# Patient Record
Sex: FEMALE | Race: WHITE
[De-identification: names, ages, dates, MRNs, and addresses within clinical notes are randomized per-mention and may not be internally consistent; named-entity substitution may affect disease eponyms.]

---

## 2019-03-10 ENCOUNTER — HOSPITAL ENCOUNTER (OUTPATIENT)
Dept: HOSPITAL 92 - ERS | Age: 54
Setting detail: OBSERVATION
LOS: 2 days | Discharge: HOME | End: 2019-03-12
Attending: INTERNAL MEDICINE | Admitting: INTERNAL MEDICINE
Payer: COMMERCIAL

## 2019-03-10 VITALS — BODY MASS INDEX: 37.5 KG/M2

## 2019-03-10 DIAGNOSIS — G43.909: ICD-10-CM

## 2019-03-10 DIAGNOSIS — I10: ICD-10-CM

## 2019-03-10 DIAGNOSIS — Z79.899: ICD-10-CM

## 2019-03-10 DIAGNOSIS — Z88.0: ICD-10-CM

## 2019-03-10 DIAGNOSIS — E03.9: ICD-10-CM

## 2019-03-10 DIAGNOSIS — K21.9: ICD-10-CM

## 2019-03-10 DIAGNOSIS — Z88.5: ICD-10-CM

## 2019-03-10 DIAGNOSIS — R55: Primary | ICD-10-CM

## 2019-03-10 LAB
ALBUMIN SERPL BCG-MCNC: 4.2 G/DL (ref 3.5–5)
ALP SERPL-CCNC: 97 U/L (ref 40–150)
ALT SERPL W P-5'-P-CCNC: 18 U/L (ref 8–55)
ANION GAP SERPL CALC-SCNC: 13 MMOL/L (ref 10–20)
APAP SERPL-MCNC: (no result) MCG/ML (ref 10–30)
AST SERPL-CCNC: 19 U/L (ref 5–34)
BASOPHILS # BLD AUTO: 0.1 THOU/UL (ref 0–0.2)
BASOPHILS NFR BLD AUTO: 1.6 % (ref 0–1)
BILIRUB SERPL-MCNC: 0.3 MG/DL (ref 0.2–1.2)
BUN SERPL-MCNC: 18 MG/DL (ref 9.8–20.1)
CALCIUM SERPL-MCNC: 10.3 MG/DL (ref 7.8–10.44)
CHLORIDE SERPL-SCNC: 110 MMOL/L (ref 98–107)
CK SERPL-CCNC: 80 U/L (ref 29–168)
CO2 SERPL-SCNC: 25 MMOL/L (ref 22–29)
CREAT CL PREDICTED SERPL C-G-VRATE: 0 ML/MIN (ref 70–130)
DRUG SCREEN CUTOFF: (no result)
EOSINOPHIL # BLD AUTO: 0.3 THOU/UL (ref 0–0.7)
EOSINOPHIL NFR BLD AUTO: 4.5 % (ref 0–10)
GLOBULIN SER CALC-MCNC: 2.8 G/DL (ref 2.4–3.5)
GLUCOSE SERPL-MCNC: 90 MG/DL (ref 70–105)
HGB BLD-MCNC: 12.6 G/DL (ref 12–16)
LIPASE SERPL-CCNC: 31 U/L (ref 8–78)
LYMPHOCYTES # BLD: 1.5 THOU/UL (ref 1.2–3.4)
LYMPHOCYTES NFR BLD AUTO: 26.2 % (ref 21–51)
MCH RBC QN AUTO: 27.6 PG (ref 27–31)
MCV RBC AUTO: 89.4 FL (ref 78–98)
MEDTOX CONTROL LINE VALID?: (no result)
MEDTOX READER #: (no result)
MONOCYTES # BLD AUTO: 0.8 THOU/UL (ref 0.11–0.59)
MONOCYTES NFR BLD AUTO: 13.1 % (ref 0–10)
NEUTROPHILS # BLD AUTO: 3.2 THOU/UL (ref 1.4–6.5)
NEUTROPHILS NFR BLD AUTO: 54.5 % (ref 42–75)
PLATELET # BLD AUTO: 266 THOU/UL (ref 130–400)
POTASSIUM SERPL-SCNC: 4.5 MMOL/L (ref 3.5–5.1)
RBC # BLD AUTO: 4.56 MILL/UL (ref 4.2–5.4)
SALICYLATES SERPL-MCNC: (no result) MG/DL (ref 15–30)
SODIUM SERPL-SCNC: 143 MMOL/L (ref 136–145)
SP GR UR STRIP: 1.01 (ref 1–1.04)
TROPONIN I SERPL DL<=0.01 NG/ML-MCNC: (no result) NG/ML (ref ?–0.03)
TROPONIN I SERPL DL<=0.01 NG/ML-MCNC: (no result) NG/ML (ref ?–0.03)
WBC # BLD AUTO: 5.9 THOU/UL (ref 4.8–10.8)

## 2019-03-10 PROCEDURE — 80053 COMPREHEN METABOLIC PANEL: CPT

## 2019-03-10 PROCEDURE — 96375 TX/PRO/DX INJ NEW DRUG ADDON: CPT

## 2019-03-10 PROCEDURE — 83880 ASSAY OF NATRIURETIC PEPTIDE: CPT

## 2019-03-10 PROCEDURE — 84146 ASSAY OF PROLACTIN: CPT

## 2019-03-10 PROCEDURE — 71045 X-RAY EXAM CHEST 1 VIEW: CPT

## 2019-03-10 PROCEDURE — 80307 DRUG TEST PRSMV CHEM ANLYZR: CPT

## 2019-03-10 PROCEDURE — 70551 MRI BRAIN STEM W/O DYE: CPT

## 2019-03-10 PROCEDURE — G0378 HOSPITAL OBSERVATION PER HR: HCPCS

## 2019-03-10 PROCEDURE — 82140 ASSAY OF AMMONIA: CPT

## 2019-03-10 PROCEDURE — 87086 URINE CULTURE/COLONY COUNT: CPT

## 2019-03-10 PROCEDURE — 36415 COLL VENOUS BLD VENIPUNCTURE: CPT

## 2019-03-10 PROCEDURE — 80048 BASIC METABOLIC PNL TOTAL CA: CPT

## 2019-03-10 PROCEDURE — 70496 CT ANGIOGRAPHY HEAD: CPT

## 2019-03-10 PROCEDURE — 87040 BLOOD CULTURE FOR BACTERIA: CPT

## 2019-03-10 PROCEDURE — 71275 CT ANGIOGRAPHY CHEST: CPT

## 2019-03-10 PROCEDURE — 96361 HYDRATE IV INFUSION ADD-ON: CPT

## 2019-03-10 PROCEDURE — 85025 COMPLETE CBC W/AUTO DIFF WBC: CPT

## 2019-03-10 PROCEDURE — 70498 CT ANGIOGRAPHY NECK: CPT

## 2019-03-10 PROCEDURE — 85379 FIBRIN DEGRADATION QUANT: CPT

## 2019-03-10 PROCEDURE — 70450 CT HEAD/BRAIN W/O DYE: CPT

## 2019-03-10 PROCEDURE — 96365 THER/PROPH/DIAG IV INF INIT: CPT

## 2019-03-10 PROCEDURE — 84443 ASSAY THYROID STIM HORMONE: CPT

## 2019-03-10 PROCEDURE — 33285 INSJ SUBQ CAR RHYTHM MNTR: CPT

## 2019-03-10 PROCEDURE — 80306 DRUG TEST PRSMV INSTRMNT: CPT

## 2019-03-10 PROCEDURE — 87077 CULTURE AEROBIC IDENTIFY: CPT

## 2019-03-10 PROCEDURE — 93005 ELECTROCARDIOGRAM TRACING: CPT

## 2019-03-10 PROCEDURE — 93306 TTE W/DOPPLER COMPLETE: CPT

## 2019-03-10 PROCEDURE — 84484 ASSAY OF TROPONIN QUANT: CPT

## 2019-03-10 PROCEDURE — C1764 EVENT RECORDER, CARDIAC: HCPCS

## 2019-03-10 PROCEDURE — 83690 ASSAY OF LIPASE: CPT

## 2019-03-10 PROCEDURE — 81003 URINALYSIS AUTO W/O SCOPE: CPT

## 2019-03-10 PROCEDURE — 82550 ASSAY OF CK (CPK): CPT
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
## 2019-03-10 NOTE — CT
CTA THORAX WITH CONTRAST: 

 

(Computed Tomographic Angiography, chest(noncoronary) with contrast material, and image postprocessin
g)

(PE protocol)

 

DATE: 3/10/2019.

 

HISTORY: 

A 54-year-old female with elevated D-dimer, status post syncope.

 

TECHNIQUE: 

IV injection of iodinated contrast: 100 mL of Isovue 370.

Scan acquisition timing attempted to coincide with iodinated contrast bolus reaching maximal density 
in pulmonary arteries.

3D MIP reconstructions.

 

FINDINGS:

A small, apparently paraesophageal, hiatal hernia.  No thoracic aortic aneurysm or dissection.  No pu
lmonary thromboembolism.  No pleural effusion, pneumothorax, consolidation, or pulmonary edema.

 

IMPRESSION:

1.  No pulmonary thromboembolism.

 

2.  No acute intrathoracic findings.

 

3.  Small paraesophageal hiatal hernia.

 

 

alejandra[]

 

POS: [facility]
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
## 2019-03-11 LAB
ANION GAP SERPL CALC-SCNC: 11 MMOL/L (ref 10–20)
BASOPHILS # BLD AUTO: 0.1 THOU/UL (ref 0–0.2)
BASOPHILS NFR BLD AUTO: 1.4 % (ref 0–1)
BUN SERPL-MCNC: 16 MG/DL (ref 9.8–20.1)
CALCIUM SERPL-MCNC: 9.3 MG/DL (ref 7.8–10.44)
CHLORIDE SERPL-SCNC: 109 MMOL/L (ref 98–107)
CO2 SERPL-SCNC: 23 MMOL/L (ref 22–29)
CREAT CL PREDICTED SERPL C-G-VRATE: 144 ML/MIN (ref 70–130)
EOSINOPHIL # BLD AUTO: 0.4 THOU/UL (ref 0–0.7)
EOSINOPHIL NFR BLD AUTO: 7.3 % (ref 0–10)
GLUCOSE SERPL-MCNC: 87 MG/DL (ref 70–105)
HGB BLD-MCNC: 11.7 G/DL (ref 12–16)
LYMPHOCYTES # BLD: 2.4 THOU/UL (ref 1.2–3.4)
LYMPHOCYTES NFR BLD AUTO: 40 % (ref 21–51)
MCH RBC QN AUTO: 28.2 PG (ref 27–31)
MCV RBC AUTO: 90.4 FL (ref 78–98)
MONOCYTES # BLD AUTO: 0.7 THOU/UL (ref 0.11–0.59)
MONOCYTES NFR BLD AUTO: 11.1 % (ref 0–10)
NEUTROPHILS # BLD AUTO: 2.4 THOU/UL (ref 1.4–6.5)
NEUTROPHILS NFR BLD AUTO: 40.2 % (ref 42–75)
PLATELET # BLD AUTO: 226 THOU/UL (ref 130–400)
POTASSIUM SERPL-SCNC: 4 MMOL/L (ref 3.5–5.1)
RBC # BLD AUTO: 4.14 MILL/UL (ref 4.2–5.4)
SODIUM SERPL-SCNC: 139 MMOL/L (ref 136–145)
WBC # BLD AUTO: 5.9 THOU/UL (ref 4.8–10.8)

## 2019-03-11 RX ADMIN — BUTALBITAL, ASPIRIN, AND CAFFEINE PRN TAB: 50; 325; 40 CAPSULE ORAL at 15:56
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
## 2019-03-11 NOTE — MRI
MRI BRAIN WITHOUT CONTRAST:

 

INDICATIONS:

Syncope.

 

FINDINGS:

There is no ventriculomegaly, mass effect, midline shift, or acute territorial infarction.  No hemorr
hagic susceptibility.  There is mild chronic ischemic disease.  There is a partially empty sella.

 

IMPRESSION:

No acute territorial infarction or intracranial mass effect.

 

POS: [facility]
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
## 2019-03-11 NOTE — PDOC.PN
- Subjective


Encounter Start Date: 03/11/19


Encounter Start Time: 10:10


Subjective: Patient with migraine HA all morning. Usually takes Fiorocet at 


-: home for this. No dizziness. No presyncopal symptoms.





- Objective


MAR Reviewed: Yes


Vital Signs & Weight: 


 Vital Signs (12 hours)











  Temp Pulse Resp BP BP BP BP


 


 03/11/19 07:25  98.4 F  61  18  145/69 H   


 


 03/11/19 03:07  97.8 F  62  20     132/72


 


 03/10/19 23:46  97.6 F  59 L  20  161/79 H   


 


 03/10/19 23:19   60    149/74 H  148/74 H  152/73 H


 


 03/10/19 22:31  97.9 F  63  20     140/72


 


 03/10/19 21:53       














  Pulse Ox


 


 03/11/19 07:25  97


 


 03/11/19 03:07  92 L


 


 03/10/19 23:46  99


 


 03/10/19 23:19 


 


 03/10/19 22:31  98


 


 03/10/19 21:53  97








 Weight











Weight                         225 lb 11.2 oz














I&O: 


 











 03/10/19 03/11/19 03/12/19





 06:59 06:59 06:59


 


Intake Total  720 


 


Output Total  600 


 


Balance  120 











Result Diagrams: 


 03/11/19 05:06





 03/11/19 05:06





Phys Exam





- Physical Examination


Constitutional: NAD


HEENT: moist MMs


Respiratory: no wheezing, no rales, no rhonchi, clear to auscultation bilateral


Cardiovascular: RRR, no significant murmur


Gastrointestinal: soft, positive bowel sounds


Neurological: non-focal, moves all 4 limbs


Psychiatric: normal affect, A&O x 3





Dx/Plan


(1) Syncope


Code(s): R55 - SYNCOPE AND COLLAPSE   Status: Acute   Comment: no orthostatic 

BPs, ECHO and MRI pending   





(2) Hypertension


Code(s): I10 - ESSENTIAL (PRIMARY) HYPERTENSION   Status: Chronic   





(3) GERD (gastroesophageal reflux disease)


Code(s): K21.9 - GASTRO-ESOPHAGEAL REFLUX DISEASE WITHOUT ESOPHAGITIS   Status: 

Chronic   





(4) Hypothyroidism


Code(s): E03.9 - HYPOTHYROIDISM, UNSPECIFIED   Status: Chronic   





(5) Migraine


Code(s): G43.909 - MIGRAINE, UNSP, NOT INTRACTABLE, WITHOUT STATUS MIGRAINOSUS 

  Status: Acute   Comment: restart home meds, if not effective can try reglan 

and benadryl combination   





- Plan


cont current plan of care, out of bed/ambulate


awaiting cardiology and neurology consults





* .








- Discharge Day


Encounter end time: 10:20

## 2019-03-12 VITALS — SYSTOLIC BLOOD PRESSURE: 130 MMHG | TEMPERATURE: 98.7 F | DIASTOLIC BLOOD PRESSURE: 74 MMHG

## 2019-03-12 PROCEDURE — 0JH632Z INSERTION OF MONITORING DEVICE INTO CHEST SUBCUTANEOUS TISSUE AND FASCIA, PERCUTANEOUS APPROACH: ICD-10-PCS | Performed by: INTERNAL MEDICINE

## 2019-03-12 RX ADMIN — BUTALBITAL, ASPIRIN, AND CAFFEINE PRN TAB: 50; 325; 40 CAPSULE ORAL at 11:49

## 2019-03-12 NOTE — PRG
DATE OF SERVICE:  03/12/2019



CHIEF COMPLAINT:  Dizziness.



INTERVAL HISTORY:  The patient reports she was down to get a loop recorder today and

during the placement of the loop recorder, she had a hypotensive episode with

dizziness, and hopefully that was recorded at that time.  No further events were

noted and her blood pressure did go down further, and as her current report, CT

angiogram of the head and neck area showed no abnormalities.  Her MRI of the brain

was normal. 



LABORATORY WORKUP:  White count 5.9, hemoglobin 11.7, hematocrit 37.4, platelets

226.  Chemistry; sodium 139, potassium 4, chloride 109, BUN 16, creatinine 0.73. 



PHYSICAL EXAMINATION:

VITAL SIGNS:  Temperature 98.5, blood pressure 139/66, pulse 64. 

GENERAL APPEARANCE:  Well-built, well-nourished lady, who is comfortable in bed.

Higher intellectual functions normal.  Cranial nerves, normal extraocular movements.

 Tongue midline.  Motor; bulk normal, tone normal, strength 5/5 in upper and lower

extremities. 



IMPRESSION:  The patient with dizziness, described to me as spinning sensation.  She

had prior ENT workup, which was negative.  She had two episodes of loss of

consciousness so far, including this event that brought her to the hospital.  She

had a prior event in December, which was attributed to dehydration.  Her episode

does not sound like seizures, and this is more likely ENT cause or hypotension or

cardiac in nature.  Her CT angiogram is also negative for any vascular occlusion.

At this time, we do not have a clear answer for her dizziness. 



RECOMMENDATIONS:  

1. She will need re-evaluation by ENT, particularly testing for vertigo and inner

ear issues. 

2. She can follow up with Dr. Loja as outpatient.  We will check on her again if

she is still inpatient. 





Job ID:  [id number]

## 2019-03-12 NOTE — PDOC.PN
- Subjective


Encounter Start Date: 03/12/19


Encounter Start Time: 11:20


Subjective: Patient without further HA after fiorocet. Had some hypotension 

without


-: bradycardia during loop recorder implantation. Fine since.





- Objective


MAR Reviewed: Yes


Vital Signs & Weight: 


 Vital Signs (12 hours)











  Temp Pulse Resp BP BP Pulse Ox


 


 03/12/19 07:13  98.7 F  62  16   136/71  99


 


 03/12/19 02:30  98 F  54 L  16  115/58 L   95








 Weight











Weight                         227 lb 8 oz














I&O: 


 











 03/11/19 03/12/19 03/13/19





 06:59 06:59 06:59


 


Intake Total 720 2520 


 


Output Total 600 3200 


 


Balance 120 -680 











Result Diagrams: 


 03/11/19 05:06





 03/11/19 05:06





Phys Exam





- Physical Examination


Constitutional: NAD


HEENT: moist MMs


Respiratory: no wheezing, no rales, no rhonchi


incision from loop recorder well closed, no bleeding, no edema, no hematoma


Cardiovascular: RRR, no significant murmur


Musculoskeletal: no edema


Neurological: non-focal, moves all 4 limbs


Psychiatric: normal affect, A&O x 3





Dx/Plan


(1) Syncope


Code(s): R55 - SYNCOPE AND COLLAPSE   Status: Acute   Comment: no orthostatic 

BPs, ECHO and MRI normal, possible implantable loop recorder   





(2) Hypertension


Code(s): I10 - ESSENTIAL (PRIMARY) HYPERTENSION   Status: Chronic   





(3) GERD (gastroesophageal reflux disease)


Code(s): K21.9 - GASTRO-ESOPHAGEAL REFLUX DISEASE WITHOUT ESOPHAGITIS   Status: 

Chronic   





(4) Hypothyroidism


Code(s): E03.9 - HYPOTHYROIDISM, UNSPECIFIED   Status: Chronic   





(5) Migraine


Code(s): G43.909 - MIGRAINE, UNSP, NOT INTRACTABLE, WITHOUT STATUS MIGRAINOSUS 

  Status: Acute   Comment: restart home meds, if not effective can try reglan 

and benadryl combination   





- Plan


cont current plan of care, out of bed/ambulate, DVT proph w/lovenox


d/c if CT angio head and neck negative and f/u with cardiology in 7-10 days


-: no driving until cleared by Dr. Stauffer. Patient and  expressed 


-: understanding and agreement.





* .








- Discharge Day


Encounter end time: 11:40

## 2019-03-12 NOTE — CT
CT BRAIN WITHOUT CONTRAST 

CT ANGIOGRAM HEAD WITH CONTRAST

CT ANGIOGRAM NECK WITH CONTRAST:

 

Date:  03/12/19 

 

HISTORY:  

Dizziness. 

 

COMPARISON:  

MRI brain yesterday. CT brain 2 days ago. 

 

TECHNIQUE:  

CT of the brain performed without intravenous contrast. Subsequently, a CT angiogram of the head and 
neck was performed after the intravenous administration of contrast. 3D rendering provided. 

 

FINDINGS:

No acute hemorrhage or infarct. No midline shift or mass effect. Ventricular size and extra-axial CSF
 spaces are normal. Calvarium is intact. Paranasal sinuses and mastoids are clear. Globes are normal.
 

 

IMPRESSION: 

No acute intracranial hemorrhage or infarct. 

 

CT ANGIOGRAM HEAD/NECK:

The lung apices are clear. Mild degenerative disc space disease at C5-6. 

 

The vertebral arteries are codominant. Origins of both vertebral arteries are patent. No luminal irre
gularity. 

 

The origins of both common carotid arteries are patent. Per NASCET criteria, there is no hemodynamica
lly significant stenosis of the internal carotid arteries. 

 

[place] is patent without stenosis, thrombosis, nor aneurysm formation. Globes are normal. 

 

IMPRESSION: 

Normal CT angiogram of the head and neck. 

 

 

 

POS: [facility]

## 2019-03-12 NOTE — PDOC.CTH
Cardiology Progress Note





- Subjective





No new c/o's this AM. She did have some mild lightheadedness earlier but no 

changes on the monitor. I rediscussed the Linq loop recorded and she wishes to 

proceed.





- Objective


 Vital Signs











  Temp Pulse Resp BP BP Pulse Ox


 


 03/12/19 12:04  98.9 F  66  16   130/69  98


 


 03/12/19 10:12  98.5 F  64  20   139/66  99


 


 03/12/19 07:13  98.7 F  62  16   136/71  99


 


 03/12/19 02:30  98 F  54 L  16  115/58 L   95








 











Weight                         227 lb 8 oz














 











 03/11/19 03/12/19 03/13/19





 06:59 06:59 06:59


 


Intake Total 720 2520 


 


Output Total 600 3200 700


 


Balance 120 -680 -700














- Physical Examination


General/Neuro: alert & oriented x3


Neck: carotid US brisk


Lungs: CTA, unlabored respirations


Heart: PMI normal, RRR


Abdomen: NT/ND, soft





- Labs


Result Diagrams: 


 03/11/19 05:06





 03/11/19 05:06


 Troponin/CKMB











Troponin I  Less than  0.010 ng/mL (< 0.028)   03/10/19  18:47    














- Assessment/Plan





1. Syncope of undetermined etiology. Plan for implantable loop recoreder 

implant today. Pt. can be d/c'd after the Linq is placed. Follow up with Xiomy 

in 7-10 days in the office.

## 2019-03-13 NOTE — DIS
DATE OF ADMISSION:  03/10/2019



DATE OF DISCHARGE:  03/12/2019



PRIMARY CARE PHYSICIAN:  Ivy Jones.



REASON FOR ADMISSION:  Syncope.



DISCHARGE DIAGNOSES:  

1. Syncope, possibly cardiogenic in origin.

2. Hypertension.

3. Gastroesophageal reflux disease.

4. Hypothyroidism.

5. Migraine.



PROCEDURES:  

1. CT of the chest with contrast showing no pulmonary embolism and no acute

intrathoracic findings.  There is a small hiatal hernia. 

2. MRI of the brain showing no acute infarction or mass.

3. Echocardiogram showing ejection fraction of 55% to 60%.  No other severe

abnormalities. 

4. CT angiography of the head and neck showing no abnormalities.



CONSULTATIONS:  

1. Cardiology, Dr. Stauffer.

2. Neurology, Dr. Colon.



SUMMARY OF HOSPITAL COURSE:  This is a 54-year-old white female, who presented to

the hospital after a syncopal episode while she was driving, she started feeling

lightheaded.  While she was driving to [facility], her vision became cloudy.  She pulled

off into a parking lot, put on the parking bay and then passed out for an unknown

period of time.  She was trembling when she woke up and had a headache, similar to

her normal migraines.  She was seen in the hospital.  Her CT brain and CT of the

chest were all normal.  Her EKG was normal sinus rhythm.  She was put in observation

in the hospital.  Neurology and Cardiology were consulted.  On the heart monitor,

the patient had intermittent mild bradycardia that was asymptomatic, but no

discernible cause for her syncopal episode.  An MRI of the brain and CT angiography

of the brain, head, and neck were done by Neurology, which were all normal and she

was determined not to have a primarily neurologic cause of her syncope.  Dr. Stauffer

evaluated the patient and eventually decided to put in an implantable loop recorder.

 The patient actually did have a hypotensive spell at the end of this procedure.

There was no arrhythmia or bradycardia at that time.  This resolved quickly, thought

to possibly be due to the injection of lidocaine for the numbing of the chest wall.

Otherwise, she did well during hospitalization.  She did have a migraine headache

and was treated with one of her typical Fioricet with resolution.  She had no more

syncopal episodes.  She is doing well the day of discharge and is being discharged

home. 



DISCHARGE MANAGEMENT:  Discharged home.  Follow up with Dr. Stauffer in 7 to 10 days and

with Dr. Loja in a couple of weeks. 



ACTIVITY:  As tolerated, but patient is not to do driving until cleared by Dr. Stauffer.



DIET:  Healthy heart, low-sodium diet.



MEDICATIONS:  The patient is to resume all of her home medications.

1. Migraine release caplets two tablets every 6 hours as needed.

2. Fioricet 1 cap every 4 hours as needed for migraine headaches.

3. Vitamin D3 6000 units daily.

4. Lisinopril 10 mg daily.

5. Loratadine 10 mg daily.

6. Naproxen 220 mg twice a day as needed for pain.

7. Protonix 40 mg twice a day.

8. Sertraline 50 mg at night.

9. [place] Thyroid 30 mg every day and another 15 mg on Monday, Wednesday, and Friday.







Job ID:  [id number]

## 2019-03-13 NOTE — CCL
DATE OF PROCEDURE:

3/12/19

 

INDICATION:

54-year-old female with unexplained syncope. She was advised to undergo an implantable loop recorder.
 This is the second time this patient has had an episode of syncope. 

 

She was taken to the recovery area where she underwent sterile prep and then had local anesthesia luisa
lied with 1% lidocaine. Then using the sterile technique, the implantable loop device was easily impl
anted without difficulty or complication. At the end of the procedure, the patient did have hypotensi
on, a vasovagal episode but did not have any pinky syncope. The blood pressure did decrease to 60/30 
and then she was given normal saline and in a few minutes the patient recovered from the event. 

 

IMPRESSION:  

54-year-old female with unexplained syncope who underwent an implantable loop recorder today, MAGALY jacob
 i[employer] without any significant difficulties or complications except for the vasovagal episode a
nd hypotension.

## 2019-03-16 NOTE — EKG
Test Reason : NEAR SYNCOPE

Blood Pressure : ***/*** mmHG

Vent. Rate : 077 BPM     Atrial Rate : 077 BPM

   P-R Int : 150 ms          QRS Dur : 080 ms

    QT Int : 380 ms       P-R-T Axes : 031 -06 016 degrees

   QTc Int : 430 ms

 

*** Poor data quality, interpretation may be adversely affected

Normal sinus rhythm

Cannot rule out Anterior infarct , age undetermined

Abnormal ECG

 

Confirmed by MALENA CEVALLOS, DAYAN ([id number]),  PIERRE WORLEY ([id number]) on 3/16/2019 1:44:06 PM

 

Referred By:  MALENA           Confirmed By:DAYAN GUY MD

## 2019-06-07 ENCOUNTER — HOSPITAL ENCOUNTER (OUTPATIENT)
Dept: HOSPITAL 92 - BICMRI | Age: 54
Discharge: HOME | End: 2019-06-07
Attending: ORTHOPAEDIC SURGERY
Payer: COMMERCIAL

## 2019-06-07 DIAGNOSIS — S46.012A: Primary | ICD-10-CM

## 2019-06-07 DIAGNOSIS — S46.812A: ICD-10-CM

## 2019-06-07 NOTE — MRI
LEFT SHOULDER MRI WITHOUT IV CONTRAST:

 

HISTORY: 

Strain of left rotator cuff capsule, left shoulder pain, and limited range of motion for several year
s without trauma.

 

FINDINGS: 

Multiplanar, multisequence MRI examination of the left shoulder is performed.  AC joint arthrosis blanco
nges are noted with some subchondral cystic changes.  Prominent downsloping of the lateral acromion w
ith some minimal fluid and fat stranding in the subacromial bursa.  Irregular small full-thickness te
ar of the anterior supraspinatus tendon at the insertion with some associated tendinopathy.  Undersur
face and minimally delaminating tear of the subscapularis tendon superiorly.  The infraspinatus tendo
n appears intact.  Rotator cuff [id number] are within normal limits of signal and volume.  

 

IMPRESSION: 

1.  Small full-thickness nonretracted tear of the anterior supraspinatus tendon with some associated 
tendinopathy.

 

2.  Downsloping of the lateral acromion with fluid and fat stranding in the subacromial bursa.

 

3.  Small undersurface and delaminating tear of the upper subscapularis tendon.

 

POS: C

## 2019-06-19 ENCOUNTER — HOSPITAL ENCOUNTER (EMERGENCY)
Dept: HOSPITAL 92 - ERS | Age: 54
Discharge: HOME | End: 2019-06-19
Payer: COMMERCIAL

## 2019-06-19 DIAGNOSIS — I10: ICD-10-CM

## 2019-06-19 DIAGNOSIS — F41.9: ICD-10-CM

## 2019-06-19 DIAGNOSIS — K52.9: Primary | ICD-10-CM

## 2019-06-19 DIAGNOSIS — Z79.899: ICD-10-CM

## 2019-06-19 LAB
ALBUMIN SERPL BCG-MCNC: 4.1 G/DL (ref 3.5–5)
ALP SERPL-CCNC: 103 U/L (ref 40–150)
ALT SERPL W P-5'-P-CCNC: 17 U/L (ref 8–55)
ANION GAP SERPL CALC-SCNC: 15 MMOL/L (ref 10–20)
AST SERPL-CCNC: 13 U/L (ref 5–34)
BASOPHILS # BLD AUTO: 0 THOU/UL (ref 0–0.2)
BASOPHILS NFR BLD AUTO: 0.2 % (ref 0–1)
BILIRUB SERPL-MCNC: 0.5 MG/DL (ref 0.2–1.2)
BUN SERPL-MCNC: 25 MG/DL (ref 9.8–20.1)
CALCIUM SERPL-MCNC: 9.6 MG/DL (ref 7.8–10.44)
CHLORIDE SERPL-SCNC: 107 MMOL/L (ref 98–107)
CO2 SERPL-SCNC: 20 MMOL/L (ref 22–29)
CREAT CL PREDICTED SERPL C-G-VRATE: 0 ML/MIN (ref 70–130)
EOSINOPHIL # BLD AUTO: 0 THOU/UL (ref 0–0.7)
EOSINOPHIL NFR BLD AUTO: 0.3 % (ref 0–10)
GLOBULIN SER CALC-MCNC: 3 G/DL (ref 2.4–3.5)
GLUCOSE SERPL-MCNC: 113 MG/DL (ref 70–105)
HGB BLD-MCNC: 12.5 G/DL (ref 12–16)
LIPASE SERPL-CCNC: 15 U/L (ref 8–78)
LYMPHOCYTES # BLD: 1 THOU/UL (ref 1.2–3.4)
LYMPHOCYTES NFR BLD AUTO: 7.2 % (ref 21–51)
MCH RBC QN AUTO: 28.5 PG (ref 27–31)
MCV RBC AUTO: 86 FL (ref 78–98)
MONOCYTES # BLD AUTO: 1.4 THOU/UL (ref 0.11–0.59)
MONOCYTES NFR BLD AUTO: 9.5 % (ref 0–10)
NEUTROPHILS # BLD AUTO: 11.9 THOU/UL (ref 1.4–6.5)
NEUTROPHILS NFR BLD AUTO: 82.8 % (ref 42–75)
PLATELET # BLD AUTO: 203 THOU/UL (ref 130–400)
POTASSIUM SERPL-SCNC: 3.8 MMOL/L (ref 3.5–5.1)
RBC # BLD AUTO: 4.4 MILL/UL (ref 4.2–5.4)
SODIUM SERPL-SCNC: 138 MMOL/L (ref 136–145)
SP GR UR STRIP: (no result) (ref 1–1.04)
WBC # BLD AUTO: 14.4 THOU/UL (ref 4.8–10.8)

## 2019-06-19 PROCEDURE — 85025 COMPLETE CBC W/AUTO DIFF WBC: CPT

## 2019-06-19 PROCEDURE — 86901 BLOOD TYPING SEROLOGIC RH(D): CPT

## 2019-06-19 PROCEDURE — 74177 CT ABD & PELVIS W/CONTRAST: CPT

## 2019-06-19 PROCEDURE — 83605 ASSAY OF LACTIC ACID: CPT

## 2019-06-19 PROCEDURE — 81003 URINALYSIS AUTO W/O SCOPE: CPT

## 2019-06-19 PROCEDURE — 85379 FIBRIN DEGRADATION QUANT: CPT

## 2019-06-19 PROCEDURE — 80053 COMPREHEN METABOLIC PANEL: CPT

## 2019-06-19 PROCEDURE — 83690 ASSAY OF LIPASE: CPT

## 2019-06-19 PROCEDURE — 82274 ASSAY TEST FOR BLOOD FECAL: CPT

## 2019-06-19 PROCEDURE — 86850 RBC ANTIBODY SCREEN: CPT

## 2019-06-19 PROCEDURE — 93005 ELECTROCARDIOGRAM TRACING: CPT

## 2019-06-19 PROCEDURE — 96361 HYDRATE IV INFUSION ADD-ON: CPT

## 2019-06-19 PROCEDURE — 84484 ASSAY OF TROPONIN QUANT: CPT

## 2019-06-19 PROCEDURE — 86900 BLOOD TYPING SEROLOGIC ABO: CPT

## 2019-06-19 PROCEDURE — 96360 HYDRATION IV INFUSION INIT: CPT

## 2019-06-19 PROCEDURE — 36415 COLL VENOUS BLD VENIPUNCTURE: CPT

## 2019-06-19 NOTE — CT
CT ABDOMEN PELVIS WITH IV CONTRAST



HISTORY: blood in stool



COMPARISON: None



FINDINGS: The lung bases are clear. A small pericardial effusion is present. The liver, spleen, pancr
eas, adrenal glands and kidneys are normal. No calcified gallstones are seen.



A small hiatal hernia is noted. No free air, free fluid or lymphadenopathy seen in the abdomen or pel
vis. The patient is post hysterectomy. This minimal pericolonic inflammatory change in the left mid

abdomen. There are vascular calcifications without evidence of aneurysmal dilatation of the abdominal
 aorta. There are degenerative changes in the spine.



IMPRESSION:

1. Findings suggestive of left colitis. Colonoscopy is recommended.

2. Small hiatal hernia.

3. Small pericardial effusion.



Reported By: John Paul Jacome 

Electronically Signed:  6/19/2019 1:07 PM

## 2019-06-22 NOTE — EKG
Test Reason : 

Blood Pressure : ***/*** mmHG

Vent. Rate : 075 BPM     Atrial Rate : 075 BPM

   P-R Int : 164 ms          QRS Dur : 082 ms

    QT Int : 394 ms       P-R-T Axes : 030 005 032 degrees

   QTc Int : 439 ms

 

Normal sinus rhythm

Normal ECG

 

Confirmed by MICHELINE SRINIVASAN ([id number]),  PIERRE WORLEY ([id number]) on 6/22/2019 9:40:14 AM

 

Referred By:             Confirmed By:MICHELINE SRINIVASAN

## 2019-07-18 ENCOUNTER — HOSPITAL ENCOUNTER (OUTPATIENT)
Dept: HOSPITAL 92 - SDC | Age: 54
End: 2019-07-18
Attending: INTERNAL MEDICINE
Payer: COMMERCIAL

## 2019-07-18 DIAGNOSIS — Z79.899: ICD-10-CM

## 2019-07-18 DIAGNOSIS — Z88.0: ICD-10-CM

## 2019-07-18 DIAGNOSIS — Z88.5: ICD-10-CM

## 2019-07-18 DIAGNOSIS — K52.9: Primary | ICD-10-CM

## 2019-07-18 PROCEDURE — 0DJD8ZZ INSPECTION OF LOWER INTESTINAL TRACT, VIA NATURAL OR ARTIFICIAL OPENING ENDOSCOPIC: ICD-10-PCS | Performed by: INTERNAL MEDICINE

## 2019-07-18 NOTE — OP
DATE OF PROCEDURE:  07/18/2019



PROCEDURE PERFORMED:  Colonoscopy.



PREOPERATIVE DIAGNOSES:  Abnormal CT scan showing inflammatory changes of the left

colon.  She had ischemic colitis most likely based on her clinical history and

episode of hypotension preceding the episode of pain and bloody diarrhea and

findings on the CT of left-sided colitis. 



DESCRIPTION OF PROCEDURE:  Informed consent was obtained from the patient.  She was

sedated with total intravenous anesthesia.  The rectal exam was performed and was

normal.  The colonoscope was advanced to the cecum, where the ileocecal valve and

appendiceal orifice were clearly identified.  The colonic mucosa was normal

throughout.  The preparation quality was excellent.  Retroflexed views in the rectum

were normal. 



IMPRESSION:  

1. Normal colonoscopy.

2. She most likely had an acute episode of ischemic colitis that has now resolved.



RECOMMENDATIONS:  

1. Repeat colonoscopy in 10 years for screening.

2. She is encouraged to maintain adequate hydration and monitoring of her blood

pressure, especially with the use of antihypertensive medications to try to avoid

hypotensive episodes.  She is undergoing a cardiac evaluation with Dr. Looney now

regarding that as well.  Other consideration is any p.r.n. medication such as

triptans for migraine headaches, can potentially precipitate ischemic colitis;

however, she states she did not take any of these medications around the time of

this episode. 

3. Follow up in GI clinic as needed.







Job ID:  [id number]

## 2023-02-18 ENCOUNTER — HOSPITAL ENCOUNTER (EMERGENCY)
Dept: HOSPITAL 57 - BURERS | Age: 58
Discharge: ANOTHER HOSPITAL | End: 2023-02-18
Payer: COMMERCIAL

## 2023-02-18 DIAGNOSIS — E03.9: ICD-10-CM

## 2023-02-18 DIAGNOSIS — I10: ICD-10-CM

## 2023-02-18 DIAGNOSIS — K21.9: ICD-10-CM

## 2023-02-18 DIAGNOSIS — Z79.899: ICD-10-CM

## 2023-02-18 DIAGNOSIS — R07.2: Primary | ICD-10-CM

## 2023-02-18 LAB
ALBUMIN SERPL BCG-MCNC: 4.1 G/DL (ref 3.5–5)
ALP SERPL-CCNC: 90 U/L (ref 40–110)
ALT SERPL W P-5'-P-CCNC: 30 U/L (ref 8–55)
ANION GAP SERPL CALC-SCNC: 15 MMOL/L (ref 10–20)
AST SERPL-CCNC: 23 U/L (ref 5–34)
BILIRUB SERPL-MCNC: 0.3 MG/DL (ref 0.2–1.2)
BUN SERPL-MCNC: 20 MG/DL (ref 9.8–20.1)
CALCIUM SERPL-MCNC: 9.8 MG/DL (ref 7.8–10.44)
CHLORIDE SERPL-SCNC: 107 MMOL/L (ref 98–107)
CO2 SERPL-SCNC: 21 MMOL/L (ref 22–29)
CREAT CL PREDICTED SERPL C-G-VRATE: 0 ML/MIN (ref 70–130)
GLOBULIN SER CALC-MCNC: 3.2 G/DL (ref 2.4–3.5)
GLUCOSE SERPL-MCNC: 88 MG/DL (ref 70–105)
HGB BLD-MCNC: 13.2 G/DL (ref 12–16)
MCH RBC QN AUTO: 28.7 PG (ref 27–31)
MCV RBC AUTO: 86.8 FL (ref 78–98)
MDIFF COMPLETE?: YES
PLATELET # BLD AUTO: 274 10X3/UL (ref 130–400)
POTASSIUM SERPL-SCNC: 4.4 MMOL/L (ref 3.5–5.1)
RBC # BLD AUTO: 4.59 MILL/UL (ref 4.2–5.4)
SODIUM SERPL-SCNC: 139 MMOL/L (ref 136–145)
SP GR UR STRIP: 1.01 (ref 1–1.03)
WBC # BLD AUTO: 6.3 10X3/UL (ref 4.8–10.8)
WBC UR QL AUTO: (no result) HPF (ref 0–3)

## 2023-02-18 PROCEDURE — 84484 ASSAY OF TROPONIN QUANT: CPT

## 2023-02-18 PROCEDURE — 85025 COMPLETE CBC W/AUTO DIFF WBC: CPT

## 2023-02-18 PROCEDURE — 74174 CTA ABD&PLVS W/CONTRAST: CPT

## 2023-02-18 PROCEDURE — 93005 ELECTROCARDIOGRAM TRACING: CPT

## 2023-02-18 PROCEDURE — 83880 ASSAY OF NATRIURETIC PEPTIDE: CPT

## 2023-02-18 PROCEDURE — 81003 URINALYSIS AUTO W/O SCOPE: CPT

## 2023-02-18 PROCEDURE — 71045 X-RAY EXAM CHEST 1 VIEW: CPT

## 2023-02-18 PROCEDURE — 80053 COMPREHEN METABOLIC PANEL: CPT

## 2023-02-18 PROCEDURE — 81015 MICROSCOPIC EXAM OF URINE: CPT

## 2023-02-18 PROCEDURE — 96374 THER/PROPH/DIAG INJ IV PUSH: CPT

## 2023-02-18 PROCEDURE — 71275 CT ANGIOGRAPHY CHEST: CPT

## 2023-02-18 PROCEDURE — 96376 TX/PRO/DX INJ SAME DRUG ADON: CPT

## 2023-03-09 ENCOUNTER — HOSPITAL ENCOUNTER (OUTPATIENT)
Dept: HOSPITAL 92 - CSHMRI | Age: 58
Discharge: HOME | End: 2023-03-09
Attending: NURSE PRACTITIONER
Payer: COMMERCIAL

## 2023-03-09 DIAGNOSIS — Z98.890: ICD-10-CM

## 2023-03-09 DIAGNOSIS — M43.16: ICD-10-CM

## 2023-03-09 DIAGNOSIS — G62.9: ICD-10-CM

## 2023-03-09 DIAGNOSIS — M47.816: ICD-10-CM

## 2023-03-09 DIAGNOSIS — M48.061: Primary | ICD-10-CM

## 2023-03-09 PROCEDURE — 72148 MRI LUMBAR SPINE W/O DYE: CPT

## 2024-12-16 ENCOUNTER — HOSPITAL ENCOUNTER (OUTPATIENT)
Dept: HOSPITAL 92 - CSHMAMMO | Age: 59
Discharge: HOME | End: 2024-12-16
Payer: COMMERCIAL

## 2024-12-16 DIAGNOSIS — Z80.3: ICD-10-CM

## 2024-12-16 DIAGNOSIS — Z12.31: Primary | ICD-10-CM

## 2024-12-16 PROCEDURE — 77063 BREAST TOMOSYNTHESIS BI: CPT

## 2024-12-16 PROCEDURE — 77067 SCR MAMMO BI INCL CAD: CPT
